# Patient Record
Sex: MALE | Race: WHITE | NOT HISPANIC OR LATINO | Employment: OTHER | URBAN - METROPOLITAN AREA
[De-identification: names, ages, dates, MRNs, and addresses within clinical notes are randomized per-mention and may not be internally consistent; named-entity substitution may affect disease eponyms.]

---

## 2017-04-02 ENCOUNTER — NURSE TRIAGE (OUTPATIENT)
Dept: ADMINISTRATIVE | Facility: CLINIC | Age: 62
End: 2017-04-02

## 2017-04-02 NOTE — TELEPHONE ENCOUNTER
Reason for Disposition   SEVERE rectal pain (e.g., excruciating, unable to have a bowel movement)    Protocols used: ST RECTAL SYMPTOMS-A-AH    Patient is reporting severe rectal pain due to a hemorrhoid. He states the hemorrhoid is about the size of a thumbnail. He states the pain is so severe he can hardly sit or walk and this started over the last two days. He has tried otc medication recommended by pharmacist and warm sitz baths but no relief. Advised to be seen in the ED. He would like hemorrhoid to be lanced. Advised that would be up to the ED physician at the time of visit based on assessment.

## 2017-05-09 PROBLEM — Z87.898 HISTORY OF SYNCOPE: Status: ACTIVE | Noted: 2017-05-09

## 2017-05-09 PROBLEM — I73.9 CLAUDICATION IN PERIPHERAL VASCULAR DISEASE: Status: ACTIVE | Noted: 2017-05-09

## 2017-06-14 PROBLEM — J44.1 ASTHMA EXACERBATION IN COPD: Status: ACTIVE | Noted: 2017-06-14

## 2017-06-14 PROBLEM — J45.901 ASTHMA EXACERBATION IN COPD: Status: ACTIVE | Noted: 2017-06-14

## 2017-07-24 PROBLEM — R07.9 CHEST PAIN: Status: ACTIVE | Noted: 2017-07-24

## 2017-07-25 PROBLEM — D72.829 LEUKOCYTOSIS: Status: ACTIVE | Noted: 2017-07-25

## 2017-07-27 ENCOUNTER — PATIENT OUTREACH (OUTPATIENT)
Dept: ADMINISTRATIVE | Facility: CLINIC | Age: 62
End: 2017-07-27

## 2017-07-27 NOTE — PATIENT INSTRUCTIONS

## 2017-07-27 NOTE — PROGRESS NOTES
TCC script completed with patient. Patient reports missed all am appointments due to N/D; althought at present he reports feels better. Encouraged to go for US and appointment with Pulmonary but refuses. Continues to smoke 9 cigarettes daily he reports.

## 2017-09-12 PROBLEM — R73.01 IFG (IMPAIRED FASTING GLUCOSE): Status: ACTIVE | Noted: 2017-09-12

## 2018-05-14 PROBLEM — M54.17 LUMBOSACRAL RADICULOPATHY AT L4: Status: ACTIVE | Noted: 2018-05-14

## 2019-05-29 ENCOUNTER — PATIENT OUTREACH (OUTPATIENT)
Dept: ADMINISTRATIVE | Facility: HOSPITAL | Age: 64
End: 2019-05-29

## 2019-06-19 PROBLEM — J44.1 ASTHMA EXACERBATION IN COPD: Status: RESOLVED | Noted: 2017-06-14 | Resolved: 2019-06-19

## 2019-06-19 PROBLEM — J45.901 ASTHMA EXACERBATION IN COPD: Status: RESOLVED | Noted: 2017-06-14 | Resolved: 2019-06-19

## 2019-08-23 PROBLEM — J96.11 CHRONIC HYPOXEMIC RESPIRATORY FAILURE: Status: ACTIVE | Noted: 2019-08-23

## 2019-10-21 PROBLEM — E86.0 DEHYDRATION: Status: ACTIVE | Noted: 2019-10-21

## 2019-12-28 PROBLEM — I44.2 COMPLETE HEART BLOCK: Status: ACTIVE | Noted: 2019-12-28

## 2019-12-29 PROBLEM — R45.1 AGITATION: Status: ACTIVE | Noted: 2019-12-29

## 2019-12-29 PROBLEM — J43.9 EMPHYSEMA: Status: ACTIVE | Noted: 2019-12-29

## 2019-12-29 PROBLEM — I65.22 OCCLUSION OF LEFT CAROTID ARTERY: Status: ACTIVE | Noted: 2019-12-29

## 2020-01-03 PROBLEM — E63.9 INADEQUATE DIETARY ENERGY INTAKE: Status: ACTIVE | Noted: 2020-01-03

## 2020-02-12 PROBLEM — J44.1 COPD WITH ACUTE EXACERBATION: Status: ACTIVE | Noted: 2020-02-12

## 2020-02-19 PROBLEM — R19.7 DIARRHEA: Status: ACTIVE | Noted: 2020-02-19

## 2020-02-19 PROBLEM — I65.23 BILATERAL CAROTID ARTERY STENOSIS: Status: ACTIVE | Noted: 2020-02-19

## 2020-02-19 PROBLEM — R53.1 WEAKNESS: Status: ACTIVE | Noted: 2020-02-19

## 2020-02-19 PROBLEM — E87.1 HYPONATREMIA: Status: ACTIVE | Noted: 2020-02-19

## 2020-02-19 PROBLEM — Z95.0 S/P PLACEMENT OF CARDIAC PACEMAKER: Status: ACTIVE | Noted: 2020-02-19

## 2020-05-04 ENCOUNTER — PATIENT OUTREACH (OUTPATIENT)
Dept: ADMINISTRATIVE | Facility: HOSPITAL | Age: 65
End: 2020-05-04

## 2020-05-18 PROBLEM — D64.9 ANEMIA: Status: ACTIVE | Noted: 2020-05-18

## 2020-05-29 PROBLEM — R91.1 NODULE OF LEFT LUNG: Status: ACTIVE | Noted: 2020-05-29

## 2020-09-29 PROBLEM — R65.20 SEVERE SEPSIS: Status: ACTIVE | Noted: 2020-09-29

## 2020-09-29 PROBLEM — D62 ACUTE BLOOD LOSS ANEMIA: Status: ACTIVE | Noted: 2020-09-29

## 2020-09-29 PROBLEM — R41.82 AMS (ALTERED MENTAL STATUS): Status: ACTIVE | Noted: 2020-09-29

## 2020-09-29 PROBLEM — D50.0 IRON DEFICIENCY ANEMIA DUE TO CHRONIC BLOOD LOSS: Status: ACTIVE | Noted: 2020-09-29

## 2020-09-29 PROBLEM — A41.9 SEVERE SEPSIS: Status: ACTIVE | Noted: 2020-09-29

## 2020-10-05 PROBLEM — D62 ACUTE BLOOD LOSS ANEMIA: Status: RESOLVED | Noted: 2020-09-29 | Resolved: 2020-10-05

## 2020-10-05 PROBLEM — A41.9 SEVERE SEPSIS: Status: RESOLVED | Noted: 2020-09-29 | Resolved: 2020-10-05

## 2020-10-05 PROBLEM — R41.82 AMS (ALTERED MENTAL STATUS): Status: RESOLVED | Noted: 2020-09-29 | Resolved: 2020-10-05

## 2020-10-05 PROBLEM — R65.20 SEVERE SEPSIS: Status: RESOLVED | Noted: 2020-09-29 | Resolved: 2020-10-05

## 2020-10-28 PROBLEM — I73.9 PAD (PERIPHERAL ARTERY DISEASE): Status: ACTIVE | Noted: 2020-10-28

## 2020-10-28 PROBLEM — Z95.0 S/P PLACEMENT OF CARDIAC PACEMAKER: Status: RESOLVED | Noted: 2020-02-19 | Resolved: 2020-10-28

## 2020-10-28 PROBLEM — I65.22 OCCLUSION OF LEFT CAROTID ARTERY: Status: RESOLVED | Noted: 2019-12-29 | Resolved: 2020-10-28

## 2020-10-29 PROBLEM — R06.09 DYSPNEA ON EXERTION: Status: ACTIVE | Noted: 2020-10-29

## 2020-10-29 PROBLEM — Z95.0 PACEMAKER: Status: ACTIVE | Noted: 2020-10-29

## 2020-10-29 PROBLEM — I36.1 NONRHEUMATIC TRICUSPID VALVE REGURGITATION: Status: ACTIVE | Noted: 2020-10-29

## 2020-10-29 PROBLEM — Z99.81 ON HOME OXYGEN THERAPY: Status: ACTIVE | Noted: 2020-10-29

## 2020-11-04 PROBLEM — R58 BLOOD LOSS: Status: ACTIVE | Noted: 2020-11-04

## 2021-06-10 ENCOUNTER — PATIENT OUTREACH (OUTPATIENT)
Dept: ADMINISTRATIVE | Facility: CLINIC | Age: 66
End: 2021-06-10

## 2021-06-19 PROBLEM — J44.9 COPD (CHRONIC OBSTRUCTIVE PULMONARY DISEASE): Status: ACTIVE | Noted: 2021-06-19

## 2021-06-19 PROBLEM — R06.02 SHORTNESS OF BREATH: Status: ACTIVE | Noted: 2021-06-19

## 2021-06-25 PROBLEM — Z03.89 OBSERVATION FOR SUSPECTED CANCER: Status: ACTIVE | Noted: 2021-06-25

## 2021-06-25 PROBLEM — R91.1 LUNG NODULE: Status: ACTIVE | Noted: 2021-06-25

## 2021-07-06 PROBLEM — J18.9 PNEUMONIA: Status: ACTIVE | Noted: 2021-07-06

## 2021-07-09 PROBLEM — D62 ACUTE BLOOD LOSS ANEMIA: Status: RESOLVED | Noted: 2020-09-29 | Resolved: 2021-07-09

## 2021-07-09 PROBLEM — E87.1 HYPONATREMIA: Status: RESOLVED | Noted: 2020-02-19 | Resolved: 2021-07-09
